# Patient Record
Sex: FEMALE | URBAN - METROPOLITAN AREA
[De-identification: names, ages, dates, MRNs, and addresses within clinical notes are randomized per-mention and may not be internally consistent; named-entity substitution may affect disease eponyms.]

---

## 2023-04-07 ENCOUNTER — OFFICE VISIT (OUTPATIENT)
Dept: URBAN - METROPOLITAN AREA CLINIC 111 | Facility: CLINIC | Age: 43
End: 2023-04-07
Payer: COMMERCIAL

## 2023-04-07 ENCOUNTER — DASHBOARD ENCOUNTERS (OUTPATIENT)
Age: 43
End: 2023-04-07

## 2023-04-07 VITALS
DIASTOLIC BLOOD PRESSURE: 86 MMHG | TEMPERATURE: 97.9 F | HEIGHT: 66 IN | BODY MASS INDEX: 25.71 KG/M2 | HEART RATE: 70 BPM | WEIGHT: 160 LBS | SYSTOLIC BLOOD PRESSURE: 138 MMHG

## 2023-04-07 DIAGNOSIS — K59.00 CONSTIPATION, UNSPECIFIED CONSTIPATION TYPE: ICD-10-CM

## 2023-04-07 DIAGNOSIS — R12 HEART BURN: ICD-10-CM

## 2023-04-07 DIAGNOSIS — R09.89 THROAT CLEARING: ICD-10-CM

## 2023-04-07 DIAGNOSIS — R14.0 ABDOMINAL BLOATING: ICD-10-CM

## 2023-04-07 PROBLEM — 14760008: Status: ACTIVE | Noted: 2023-04-07

## 2023-04-07 PROCEDURE — 99203 OFFICE O/P NEW LOW 30 MIN: CPT | Performed by: NURSE PRACTITIONER

## 2023-04-07 PROCEDURE — 99243 OFF/OP CNSLTJ NEW/EST LOW 30: CPT | Performed by: NURSE PRACTITIONER

## 2023-04-07 PROCEDURE — 99243 OFF/OP CNSLTJ NEW/EST LOW 30: CPT | Performed by: INTERNAL MEDICINE

## 2023-04-07 PROCEDURE — 99203 OFFICE O/P NEW LOW 30 MIN: CPT | Performed by: INTERNAL MEDICINE

## 2023-04-07 NOTE — HPI-TODAY'S VISIT:
43 yo female patient was referred by Dr. Vyas  for GI symptoms. A copy of this document will be sent to the referring physician. Reports dry cough, clearing throat, heart burn, abd bloating, gas and constipation for few weeks. H pylori neg per patient. Taking omeprazole 20mg daily without relief. Reports having similar sxs about 2 years ago. Denies fever, chills, abd pain, nausea, vomiting, early satiety, dysphagia, odynophagia, melena, hematochezia or weight loss. No prior egd. Denies fh gi malignancy.